# Patient Record
Sex: MALE | Race: WHITE | NOT HISPANIC OR LATINO | URBAN - METROPOLITAN AREA
[De-identification: names, ages, dates, MRNs, and addresses within clinical notes are randomized per-mention and may not be internally consistent; named-entity substitution may affect disease eponyms.]

---

## 2017-05-23 ENCOUNTER — APPOINTMENT (RX ONLY)
Dept: URBAN - METROPOLITAN AREA OTHER 12 | Facility: OTHER | Age: 37
Setting detail: DERMATOLOGY
End: 2017-05-23

## 2017-05-23 DIAGNOSIS — L71.8 OTHER ROSACEA: ICD-10-CM

## 2017-05-23 PROCEDURE — 99212 OFFICE O/P EST SF 10 MIN: CPT

## 2017-05-23 PROCEDURE — ? TREATMENT REGIMEN

## 2017-05-23 PROCEDURE — ? COUNSELING

## 2017-05-23 ASSESSMENT — LOCATION SIMPLE DESCRIPTION DERM: LOCATION SIMPLE: LEFT CHEEK

## 2017-05-23 ASSESSMENT — LOCATION DETAILED DESCRIPTION DERM: LOCATION DETAILED: LEFT CENTRAL MALAR CHEEK

## 2017-05-23 ASSESSMENT — LOCATION ZONE DERM: LOCATION ZONE: FACE

## 2017-05-23 NOTE — PROCEDURE: TREATMENT REGIMEN
Discontinue Regimen: Mirvaso gel
Detail Level: Zone
Plan: Patient recommended to try Rhofade cream. If patient likes new medication, then RX can be called in, or patient may be prescribed Mirvaso gel again
Samples Given: Rhofade cream with co-pay card and Avar green cream, apply 1-3 times daily

## 2017-06-02 ENCOUNTER — RX ONLY (OUTPATIENT)
Age: 37
Setting detail: RX ONLY
End: 2017-06-02

## 2017-06-02 RX ORDER — OXYMETAZOLINE HYDROCHLORIDE 10 MG/G
CREAM TOPICAL
Qty: 1 | Refills: 2 | Status: ERX | COMMUNITY
Start: 2017-06-02